# Patient Record
Sex: FEMALE | Race: WHITE | Employment: STUDENT | URBAN - METROPOLITAN AREA
[De-identification: names, ages, dates, MRNs, and addresses within clinical notes are randomized per-mention and may not be internally consistent; named-entity substitution may affect disease eponyms.]

---
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
## 2021-01-03 NOTE — PATIENT INSTRUCTIONS
Thank you for allowing me to participate in your care, it was a pleasure to care for you today. Should any questions or concerns arise, please call or message me on My Chart.     Blood work was ordered at your visit today. Some of the labs require you to be FASTING. This means nothing to eat or drink for 8-10 hours before. It is okay to have water and black coffee.    Mindfulness Apps & Websites  Self-Help Aps   1. Headspace  2. Buddhify  3. Pause  4. Level Up  5. Smiling Minds  6. Simply Being  7. Cohesive Self  8. Stop Breathe Think  9. MindShift    Self-help Websites  1. Calm.Chimeros - free guided meditations including a focus on sleep  2. Anxiety BC website -free handouts & tools for managing anxiety & panic  https://www.anxietybc.com/tools  https://www.anxietybc.com/adults/self-help-toolkit-anxiety-disorders  3. [employer] - free resource page for mindfulness recordings, poetry, handouts, articles:   http://www.weendypartPandora.TV.org/resources.shtml  4. Live Mindfully website - Free guided audio files to practice mindfulness-based stress reduction:  http://livemindfully.Nexio.Chimeros/  5. [facility] - Free guided audio files to practice mindfulness-based stress reduction:  https://health.[facility]/specialties/mindfulness/programs/mbsr/Pages/audio.aspx  6. [facility] Mindfulness Awareness Research Center - free guided meditations:  http://[facility]/mindful-meditations    Patient Education   Influenza (Flu) Vaccine (Inactivated or Recombinant): What You Need to Know  1. Why get vaccinated?  Influenza vaccine can prevent influenza (flu).  Flu is a contagious disease that spreads around the [place] every year, usually between October and May. Anyone can get the flu, but it is more dangerous for some people. Infants and young children, people 65 years of age and older, pregnant women, and people with certain health conditions or a weakened immune system are at greatest risk  of flu complications.  Pneumonia, bronchitis, sinus infections and ear infections are examples of flu-related complications. If you have a medical condition, such as heart disease, cancer or diabetes, flu can make it worse.  Flu can cause fever and chills, sore throat, muscle aches, fatigue, cough, headache, and runny or stuffy nose. Some people may have vomiting and diarrhea, though this is more common in children than adults.  Each year thousands of people in the [place] die from flu, and many more are hospitalized. Flu vaccine prevents millions of illnesses and flu-related visits to the doctor each year.  2. Influenza vaccine  CDC recommends everyone 6 months of age and older get vaccinated every flu season. Children 6 months through 8 years of age may need 2 doses during a single flu season. Everyone else needs only 1 dose each flu season.  It takes about 2 weeks for protection to develop after vaccination.  There are many flu viruses, and they are always changing. Each year a new flu vaccine is made to protect against three or four viruses that are likely to cause disease in the upcoming flu season. Even when the vaccine doesn't exactly match these viruses, it may still provide some protection.  Influenza vaccine does not cause flu.  Influenza vaccine may be given at the same time as other vaccines.  3. Talk with your health care provider  Tell your vaccine provider if the person getting the vaccine:  · Has had an allergic reaction after a previous dose of influenza vaccine, or has any severe, life-threatening allergies.  · Has ever had Guillain-Barré Syndrome (also called GBS).  In some cases, your health care provider may decide to postpone influenza vaccination to a future visit.  People with minor illnesses, such as a cold, may be vaccinated. People who are moderately or severely ill should usually wait until they recover before getting influenza vaccine.  Your health care provider can give you more  information.  4. Risks of a vaccine reaction  · Soreness, redness, and swelling where shot is given, fever, muscle aches, and headache can happen after influenza vaccine.  · There may be a very small increased risk of Guillain-Barré Syndrome (GBS) after inactivated influenza vaccine (the flu shot).  Young children who get the flu shot along with pneumococcal vaccine (PCV13), and/or DTaP vaccine at the same time might be slightly more likely to have a seizure caused by fever. Tell your health care provider if a child who is getting flu vaccine has ever had a seizure.  People sometimes faint after medical procedures, including vaccination. Tell your provider if you feel dizzy or have vision changes or ringing in the ears.  As with any medicine, there is a very remote chance of a vaccine causing a severe allergic reaction, other serious injury, or death.  5. What if there is a serious problem?  An allergic reaction could occur after the vaccinated person leaves the clinic. If you see signs of a severe allergic reaction (hives, swelling of the face and throat, difficulty breathing, a fast heartbeat, dizziness, or weakness), call 9-1-1 and get the person to the nearest hospital.  For other signs that concern you, call your health care provider.  Adverse reactions should be reported to the Vaccine Adverse Event Reporting System (VAERS). Your health care provider will usually file this report, or you can do it yourself. Visit the VAERS website at www.vaers.hhs.gov or call 1-550.450.4728.VAERS is only for reporting reactions, and VAERS staff do not give medical advice.  6. The National Vaccine Injury Compensation Program  The National Vaccine Injury Compensation Program (VICP) is a federal program that was created to compensate people who may have been injured by certain vaccines. Visit the VICP website at www.hrsa.gov/vaccinecompensation or call 1-606.162.1270 to learn about the program and about filing a claim. There is a  time limit to file a claim for compensation.  7. How can I learn more?  · Ask your healthcare provider.  · Call your local or state health department.  · Contact the Centers for Disease Control and Prevention (CDC):  ? Call 1-628.521.7101 (5-577-DPX-INFO) or  ? Visit CDC's www.cdc.gov/flu  Vaccine Information Statement (Interim) Inactivated Influenza Vaccine (8/15/2019)  This information is not intended to replace advice given to you by your health care provider. Make sure you discuss any questions you have with your health care provider.  Document Released: 10/12/2007 Document Revised: 04/07/2020 Document Reviewed: 08/19/2019  Elsevier Patient Education © 2020 [employer]vier Inc.

## 2021-01-03 NOTE — PROGRESS NOTES
"NEW PATIENT - PROBLEM VISIT    CAROLINA LOMAX D.O.  [facility]  [address] [place] [address]  5th [address]  [place] [address]  [phone]      HISTORY OF PRESENT ILLNESS        CC: [facility]  Dr. Lawler - [facility]     HPI:  Nasreen Hector is a 20 y.o. female who presents to establish care.     1. Hashimoto's Thyroiditis: Has not had blood work in a while. Not currently on any medication, she has never needed in the past, but feels like is really fatigued and having a lot of muscle aches.   2. Unexplained Weight Loss: She has had work-up including Lyme, celiac, vitamin D, and some type of autoimmune work-up in the past 2 years. She lost about 7-10 pounds 2 years ago, but her weight has been stable now for the past year.   3. Joint Pain: She has had pain in her hips, knees, and ankles. She doesn't feel limited, but feels like it is painful to do resistance exercises. She also feels like her feet are cold 24/7. She feels like her extremities are cold and \"numb like,\" but not numb. No fevers, but will occasionally have hot flashes for a few minutes from time to time. No rashes. She has notices some finger color change in the cold.   4. Anxiety: Never been formally diagnosed. Tries to get herself active or moving. She tries to take herself away from the moment. No interest in seeing a therapist. No mindfulness activities.     Health Maintenance:  HIV screening: due now  Hep C screening: due now  Flu vaccine: due today    PAST MEDICAL AND SURGICAL HISTORY        Past Medical History:   Diagnosis Date   • Thyroid disease        Past Surgical History:   Procedure Laterality Date   • WISDOM TOOTH EXTRACTION       MEDICATIONS        No current outpatient medications on file.  ALLERGIES        Patient has no known allergies.  FAMILY HISTORY        Family History   Problem Relation Age of Onset   • Hypertension Biological Mother      SOCIAL/ TOBACCO HISTORY        Social History     Tobacco " "Use   • Smoking status: Former Smoker     Types: Electronic Cigarette, Cigarettes   • Smokeless tobacco: Never Used   • Tobacco comment: quit 2 weeks ago    Substance Use Topics   • Alcohol use: Yes     Frequency: Monthly or less     Drinks per session: 1 or 2   • Drug use: Not Currently     REVIEW OF SYSTEMS        Review of Systems   Constitutional: Positive for unexpected weight change. Negative for chills, fatigue and fever.   HENT: Negative for dental problem, ear pain, hearing loss, rhinorrhea, sore throat and trouble swallowing.    Eyes: Negative for photophobia, pain and visual disturbance.   Respiratory: Negative for apnea, cough, chest tightness and shortness of breath.    Cardiovascular: Negative for chest pain, palpitations and leg swelling.   Gastrointestinal: Negative for abdominal pain, blood in stool, constipation, diarrhea, nausea and vomiting.   Endocrine: Negative for cold intolerance and heat intolerance.   Genitourinary: Negative for difficulty urinating, frequency and hematuria.   Musculoskeletal: Positive for arthralgias. Negative for back pain, joint swelling and myalgias.   Skin: Negative for rash and wound.   Allergic/Immunologic: Negative for environmental allergies and food allergies.   Neurological: Positive for numbness. Negative for dizziness, syncope, weakness and headaches.   Hematological: Negative for adenopathy. Does not bruise/bleed easily.   Psychiatric/Behavioral: Negative for self-injury and suicidal ideas. The patient is nervous/anxious.       PHYSICAL EXAMINATION     Visit Vitals  /70   Pulse 84   Temp 36.6 °C (97.8 °F)   Resp 16   Ht 1.651 m (5' 5\")   Wt 47.6 kg (105 lb)   SpO2 98%   BMI 17.47 kg/m²        Physical Exam  Constitutional:       General: She is not in acute distress.     Appearance: She is not diaphoretic.   HENT:      Head: Normocephalic and atraumatic.      Mouth/Throat:      Mouth: Mucous membranes are not dry.   Eyes:      Conjunctiva/sclera: " Conjunctivae normal.      Pupils: Pupils are equal, round, and reactive to light.   Neck:      Musculoskeletal: Neck supple. No neck rigidity.   Cardiovascular:      Rate and Rhythm: Normal rate and regular rhythm.      Heart sounds: No murmur. No friction rub. No gallop.    Pulmonary:      Effort: Pulmonary effort is normal.      Breath sounds: No wheezing, rhonchi or rales.   Musculoskeletal:         General: No swelling, tenderness or deformity.      Right hip: Normal.      Left hip: She exhibits normal range of motion, normal strength, no tenderness, no bony tenderness, no swelling, no crepitus, no deformity and no laceration.      Right knee: No tenderness found. No medial joint line, no lateral joint line, no MCL, no LCL and no patellar tendon tenderness noted.      Left knee: No tenderness found. No medial joint line, no lateral joint line, no MCL, no LCL and no patellar tendon tenderness noted.      Right lower leg: No edema.      Left lower leg: No edema.        Legs:    Lymphadenopathy:      Head:      Right side of head: No submandibular adenopathy.      Left side of head: No submandibular adenopathy.      Cervical: No cervical adenopathy.      Upper Body:      Right upper body: No supraclavicular adenopathy.      Left upper body: No supraclavicular adenopathy.   Neurological:      Mental Status: She is alert.      Cranial Nerves: No cranial nerve deficit.        ASSESSMENT AND PLAN   Assessment   Problem List Items Addressed This Visit        Nervous    Polyarthralgia - Primary     Multiple joint pains including hips, knees, and ankles also associated with cold extremities and Raynaud phenomenon.  She has personal history of autoimmune disease (Hashimoto's thyroiditis) as well as family history (mother with Sjogren's syndrome) making autoimmune disease much more likely.  However, would consider primary arthritis positive TIMOTHY test on left side and hip disorder could cause knee pain.  We will start with  autoimmune work-up including rheumatoid factor, anti-CCP, CRP, ESR, MERLIN, CBC, and CMP.  If any significant findings would refer to rheumatology if negative patient will follow up to determine next steps.  Would consider bilateral hip x-rays.         Relevant Orders    TSH w reflex FT4    T3, free    CBC and Differential    Comprehensive metabolic panel    MERLIN Screen (Automated)    CCP IgG/IgA Antibodies    Rheumatoid factor    Sjogrens syndrome-A&B extractable nuclear antibody    C-reactive protein    Sedimentation rate, automated    Vitamin D 25 hydroxy    Vitamin B12       Endocrine/Metabolic    Hashimoto's thyroiditis     Never required medication in the past, though feeling fatigued and having weight changes.  Check TSH, T4, and T3.            Other    General counselling and advice on contraception     Discussed contraceptive options including oral contraceptive pills, condoms, Nexplanon, IUD, Depo.  Given history of migraine with aura we discussed that oral contraceptive pills are contraindicated.  She is interested in a progesterone IUD and was referred to gynecology during today's visit.           Other Visit Diagnoses     Family history of Sjogren's disease        Relevant Orders    Sjogrens syndrome-A&B extractable nuclear antibody    Screening for HIV without presence of risk factors        Relevant Orders    HIV 1,2 AB P24 AG    Encounter for hepatitis C screening test for low risk patient        Relevant Orders    Hepatitis C antibody    Flu vaccine need        Relevant Orders    Influenza vaccine quadrivalent preservative free 6 mon and older IM (FluLaval) (Completed)           A total of 30 minutes was spent with the patient today.  Greater than 50% of the time was spent counseling the patient on the above and in coordination of her care.  Aislinn Kruse,   1/4/2021

## 2021-01-04 ENCOUNTER — OFFICE VISIT (OUTPATIENT)
Dept: PRIMARY CARE | Facility: CLINIC | Age: 21
End: 2021-01-04
Payer: COMMERCIAL

## 2021-01-04 VITALS
WEIGHT: 105 LBS | DIASTOLIC BLOOD PRESSURE: 70 MMHG | SYSTOLIC BLOOD PRESSURE: 108 MMHG | RESPIRATION RATE: 16 BRPM | TEMPERATURE: 97.8 F | HEIGHT: 65 IN | HEART RATE: 84 BPM | OXYGEN SATURATION: 98 % | BODY MASS INDEX: 17.49 KG/M2

## 2021-01-04 DIAGNOSIS — M25.50 POLYARTHRALGIA: Primary | ICD-10-CM

## 2021-01-04 DIAGNOSIS — Z30.09 GENERAL COUNSELLING AND ADVICE ON CONTRACEPTION: ICD-10-CM

## 2021-01-04 DIAGNOSIS — Z11.4 SCREENING FOR HIV WITHOUT PRESENCE OF RISK FACTORS: ICD-10-CM

## 2021-01-04 DIAGNOSIS — Z23 FLU VACCINE NEED: ICD-10-CM

## 2021-01-04 DIAGNOSIS — Z82.69 FAMILY HISTORY OF SJOGREN'S DISEASE: ICD-10-CM

## 2021-01-04 DIAGNOSIS — E06.3 HASHIMOTO'S THYROIDITIS: ICD-10-CM

## 2021-01-04 DIAGNOSIS — Z11.59 ENCOUNTER FOR HEPATITIS C SCREENING TEST FOR LOW RISK PATIENT: ICD-10-CM

## 2021-01-04 PROBLEM — G43.109 MIGRAINE WITH AURA: Status: ACTIVE | Noted: 2021-01-04

## 2021-01-04 PROCEDURE — 99203 OFFICE O/P NEW LOW 30 MIN: CPT | Mod: 25 | Performed by: FAMILY MEDICINE

## 2021-01-04 PROCEDURE — 90686 IIV4 VACC NO PRSV 0.5 ML IM: CPT | Performed by: FAMILY MEDICINE

## 2021-01-04 PROCEDURE — 90471 IMMUNIZATION ADMIN: CPT | Performed by: FAMILY MEDICINE

## 2021-01-04 SDOH — HEALTH STABILITY: MENTAL HEALTH: HOW MANY DRINKS CONTAINING ALCOHOL DO YOU HAVE ON A TYPICAL DAY WHEN YOU ARE DRINKING?: 1 OR 2

## 2021-01-04 SDOH — HEALTH STABILITY: MENTAL HEALTH: HOW OFTEN DO YOU HAVE A DRINK CONTAINING ALCOHOL?: MONTHLY OR LESS

## 2021-01-04 ASSESSMENT — ENCOUNTER SYMPTOMS
FEVER: 0
CHILLS: 0
SHORTNESS OF BREATH: 0
TROUBLE SWALLOWING: 0
JOINT SWELLING: 0
ABDOMINAL PAIN: 0
NERVOUS/ANXIOUS: 1
PALPITATIONS: 0
RHINORRHEA: 0
CHEST TIGHTNESS: 0
BLOOD IN STOOL: 0
FATIGUE: 0
NUMBNESS: 1
WOUND: 0
DIARRHEA: 0
VOMITING: 0
FREQUENCY: 0
MYALGIAS: 0
PHOTOPHOBIA: 0
COUGH: 0
DIZZINESS: 0
ADENOPATHY: 0
BRUISES/BLEEDS EASILY: 0
NAUSEA: 0
CONSTIPATION: 0
BACK PAIN: 0
EYE PAIN: 0
UNEXPECTED WEIGHT CHANGE: 1
APNEA: 0
ARTHRALGIAS: 1
HEADACHES: 0
WEAKNESS: 0
DIFFICULTY URINATING: 0
SORE THROAT: 0
HEMATURIA: 0

## 2021-01-04 ASSESSMENT — PATIENT HEALTH QUESTIONNAIRE - PHQ9: SUM OF ALL RESPONSES TO PHQ9 QUESTIONS 1 & 2: 0

## 2021-01-04 NOTE — ASSESSMENT & PLAN NOTE
Never required medication in the past, though feeling fatigued and having weight changes.  Check TSH, T4, and T3.

## 2021-01-04 NOTE — ASSESSMENT & PLAN NOTE
Discussed contraceptive options including oral contraceptive pills, condoms, Nexplanon, IUD, Depo.  Given history of migraine with aura we discussed that oral contraceptive pills are contraindicated.  She is interested in a progesterone IUD and was referred to gynecology during today's visit.   Deferred to Pediatrician's Office

## 2021-01-04 NOTE — ASSESSMENT & PLAN NOTE
Multiple joint pains including hips, knees, and ankles also associated with cold extremities and Raynaud phenomenon.  She has personal history of autoimmune disease (Hashimoto's thyroiditis) as well as family history (mother with Sjogren's syndrome) making autoimmune disease much more likely.  However, would consider primary arthritis positive TIMOTHY test on left side and hip disorder could cause knee pain.  We will start with autoimmune work-up including rheumatoid factor, anti-CCP, CRP, ESR, MERLIN, CBC, and CMP.  If any significant findings would refer to rheumatology if negative patient will follow up to determine next steps.  Would consider bilateral hip x-rays.

## 2021-01-05 LAB
25(OH)D3 SERPL-MCNC: 15 NG/ML (ref 30–100)
ALBUMIN SERPL-MCNC: 4.2 G/DL (ref 3.6–5.1)
ALBUMIN/GLOB SERPL: 1.8 (CALC) (ref 1–2.5)
ALP SERPL-CCNC: 51 U/L (ref 36–128)
ALT SERPL-CCNC: 14 U/L (ref 5–32)
ANA SER QL IF: NEGATIVE
AST SERPL-CCNC: 15 U/L (ref 12–32)
BASOPHILS # BLD AUTO: 28 CELLS/UL (ref 0–200)
BASOPHILS NFR BLD AUTO: 0.3 %
BILIRUB SERPL-MCNC: 0.5 MG/DL (ref 0.2–1.1)
BUN SERPL-MCNC: 8 MG/DL (ref 7–20)
BUN/CREAT SERPL: ABNORMAL (CALC) (ref 6–22)
CALCIUM SERPL-MCNC: 9.3 MG/DL (ref 8.9–10.4)
CCP IGG SERPL-ACNC: <16 UNITS
CHLORIDE SERPL-SCNC: 107 MMOL/L (ref 98–110)
CO2 SERPL-SCNC: 26 MMOL/L (ref 20–32)
CREAT SERPL-MCNC: 0.85 MG/DL (ref 0.5–1)
CRP SERPL-MCNC: 0.5 MG/L
ENA SS-A AB SER IA-ACNC: NORMAL AI
ENA SS-B AB SER IA-ACNC: NORMAL AI
EOSINOPHIL # BLD AUTO: 74 CELLS/UL (ref 15–500)
EOSINOPHIL NFR BLD AUTO: 0.8 %
ERYTHROCYTE [DISTWIDTH] IN BLOOD BY AUTOMATED COUNT: 12.4 % (ref 11–15)
ERYTHROCYTE [SEDIMENTATION RATE] IN BLOOD BY WESTERGREN METHOD: 2 MM/H
GLOBULIN SER CALC-MCNC: 2.3 G/DL (CALC) (ref 2–3.8)
GLUCOSE SERPL-MCNC: 78 MG/DL (ref 65–99)
HCT VFR BLD AUTO: 36.7 % (ref 35–45)
HCV AB S/CO SERPL IA: 0.02
HCV AB SERPL QL IA: NORMAL
HGB BLD-MCNC: 12.3 G/DL (ref 11.7–15.5)
HIV 1+2 AB+HIV1 P24 AG SERPL QL IA: NORMAL
LYMPHOCYTES # BLD AUTO: 1897 CELLS/UL (ref 850–3900)
LYMPHOCYTES NFR BLD AUTO: 20.4 %
MCH RBC QN AUTO: 32.4 PG (ref 27–33)
MCHC RBC AUTO-ENTMCNC: 33.5 G/DL (ref 32–36)
MCV RBC AUTO: 96.6 FL (ref 80–100)
MONOCYTES # BLD AUTO: 772 CELLS/UL (ref 200–950)
MONOCYTES NFR BLD AUTO: 8.3 %
NEUTROPHILS # BLD AUTO: 6529 CELLS/UL (ref 1500–7800)
NEUTROPHILS NFR BLD AUTO: 70.2 %
PLATELET # BLD AUTO: 216 THOUSAND/UL (ref 140–400)
PMV BLD REES-ECKER: 9.4 FL (ref 7.5–12.5)
POTASSIUM SERPL-SCNC: 3.7 MMOL/L (ref 3.8–5.1)
PROT SERPL-MCNC: 6.5 G/DL (ref 6.3–8.2)
QUEST EGFR NON-AFR. AMERICAN: 99 ML/MIN/1.73M2
RBC # BLD AUTO: 3.8 MILLION/UL (ref 3.8–5.1)
RHEUMATOID FACT SERPL-ACNC: <14 IU/ML
SODIUM SERPL-SCNC: 141 MMOL/L (ref 135–146)
T3FREE SERPL-MCNC: 2.6 PG/ML (ref 3–4.7)
TSH SERPL-ACNC: 4.23 MIU/L
VIT B12 SERPL-MCNC: 250 PG/ML (ref 200–1100)
WBC # BLD AUTO: 9.3 THOUSAND/UL (ref 3.8–10.8)

## 2021-01-14 ENCOUNTER — TELEPHONE (OUTPATIENT)
Dept: PRIMARY CARE | Facility: CLINIC | Age: 21
End: 2021-01-14

## 2021-01-14 ENCOUNTER — OFFICE VISIT (OUTPATIENT)
Dept: PRIMARY CARE | Facility: CLINIC | Age: 21
End: 2021-01-14
Payer: COMMERCIAL

## 2021-01-14 VITALS
WEIGHT: 106 LBS | DIASTOLIC BLOOD PRESSURE: 60 MMHG | OXYGEN SATURATION: 99 % | RESPIRATION RATE: 16 BRPM | BODY MASS INDEX: 17.64 KG/M2 | HEART RATE: 79 BPM | SYSTOLIC BLOOD PRESSURE: 110 MMHG | TEMPERATURE: 98 F

## 2021-01-14 DIAGNOSIS — E55.9 VITAMIN D INSUFFICIENCY: ICD-10-CM

## 2021-01-14 DIAGNOSIS — E53.8 LOW SERUM VITAMIN B12: ICD-10-CM

## 2021-01-14 DIAGNOSIS — E06.3 HASHIMOTO'S THYROIDITIS: ICD-10-CM

## 2021-01-14 DIAGNOSIS — M25.50 POLYARTHRALGIA: Primary | ICD-10-CM

## 2021-01-14 PROBLEM — Z30.09 GENERAL COUNSELLING AND ADVICE ON CONTRACEPTION: Status: RESOLVED | Noted: 2021-01-04 | Resolved: 2021-01-14

## 2021-01-14 PROCEDURE — 99213 OFFICE O/P EST LOW 20 MIN: CPT | Performed by: FAMILY MEDICINE

## 2021-01-14 RX ORDER — LEVOTHYROXINE SODIUM 25 UG/1
25 TABLET ORAL
Qty: 90 TABLET | Refills: 0 | Status: SHIPPED | OUTPATIENT
Start: 2021-01-14 | End: 2021-04-01

## 2021-01-14 ASSESSMENT — ENCOUNTER SYMPTOMS
COUGH: 0
CHILLS: 0
POLYPHAGIA: 0
FEVER: 0
ARTHRALGIAS: 1
PALPITATIONS: 0
WOUND: 0
SHORTNESS OF BREATH: 0
FATIGUE: 1
COLOR CHANGE: 0
JOINT SWELLING: 0
CHEST TIGHTNESS: 0
POLYDIPSIA: 0

## 2021-01-14 NOTE — ASSESSMENT & PLAN NOTE
Vitamin D 15 on most recent labs.   Start supplement of 2000 IU daily taken with a fat containing food. Counseled patient on vitamin D rich foods.   Repeat level in 6-8 weeks.

## 2021-01-14 NOTE — PATIENT INSTRUCTIONS
Thank you for allowing me to participate in your care, it was a pleasure to care for you today. Should any questions or concerns arise, please call or message me on My Chart.     WHAT YOU SHOULD KNOW ABOUT VITAMIN D DEFICIENCY AND SUPPLEMENTATION    Vitamin D is the only nutrient your body produces when exposed to sunlight. However, 40% of people in the [place] are deficient in vitamin D, partly because people spend more time indoors, wear sunblock outside, and eat a Western diet low in good sources of this vitamin. The recommended daily value (DV) is 800 IU (20 mcg) of vitamin D per day from foods.  If you don't get 30 minutes of sunlight a day, your intake should likely be closer to 1,000 IU (25 mcg) per day.    You can buy Vitamin D over the counter at any pharmacy. Vitamin D is best absorbed when taken with food or a meal that contains fat.     Sources of Dietary Vitamin D Include:  Wild salmon contains about 988 IU of vitamin D per serving, while farmed salmon contains 250 IU, on average. That’s 124% and 32% of the DV, respectively.  Mccarthy contains 216 IU of vitamin D per 3.5-ounce (100-gram) serving. Pickled herring, sardines, and other fatty fish, such as halibut and mackerel, are also good sources.  Cod liver oil contains 448 IU of vitamin D per teaspoon (4.9 ml), or 56% of the DV. It is also high in other nutrients, such as vitamin A and omega-3 fatty acids.  Canned tuna contains 268 IU of vitamin D per serving. Choose light tuna instead of albacore and eat 6 ounces (170 grams) or less per week to prevent methylmercury buildup.  Eggs from commercially raised hens contain only about 37 IU of vitamin D per yolk. However, eggs from hens raised outside or fed vitamin-D-enriched feed contain much higher levels.  Mushrooms can synthesize vitamin D2 when exposed to UV light. Only wild mushrooms or mushrooms treated with UV light are good sources of vitamin D.  Foods such as cow's milk, soy milk, orange  juice, cereals, and oatmeal are sometimes fortified with vitamin D. These contain  IU per serving.  ______________________________________________________________________________________________

## 2021-01-14 NOTE — ASSESSMENT & PLAN NOTE
TSH within range at 4.23 (though higher within normal range). Free T3 low at 0.62.   We discussed the risks and benefits of starting a low dose of thyroid repletion.   After discussion we decided to start 25 mcg of levothyroxine and repeat TSH, T4, and T3 in 6 weeks.

## 2021-01-14 NOTE — TELEPHONE ENCOUNTER
Patient declined to schedule follow up. Created reminder to reach out to her to schedule follow up visit.

## 2021-01-14 NOTE — ASSESSMENT & PLAN NOTE
Plan to replace thyroid and replete vitamin D and B12.   Follow-up in 6-8 weeks after labs are repeated.   If still symptomatic at that time would refer to rheumatology for further evaluation.   We also discussed the possibility of fibromyalgia and an option at follow-up would be to start low-dose amitriptyline at 10 mg nightly.

## 2021-01-14 NOTE — PROGRESS NOTES
ESTABLISHED PATIENT OFFICE VISIT    CAROLINA LOMAX D.O.  [facility]  [address] [place] [address]  [address]  [place] [address]  [phone]      HISTORY OF PRESENT ILLNESS        HPI:  Nasreen Hector is a 20 y.o. female with a history of polyarthralgia, Hashimoto's thyroiditis, and migraine with aura who presents for follow-up.     Polyarthralgia:  - CRP, ESR, Sjogren's RF, anti-CCP, and MERLIN negative  - HIV and Hep C  - No change in symptoms, still fatigued with polyarthralgias and felt like her knees might have been swollen at a higher altitude while driving to [place] the other day.    Hashimoto's Thyroiditis:  - TSH 4.23, T3 low at 2.6  - Not currently on thyroid replacement    Vitamin D Insufficiency:  - vitamin D level 15  - recommended that patient start 2000 IU daily with fat-containing food    B12:  - Most recent level was 250 (low-normal range)  - Recommended supplement of 1000 mcg daily     PAST MEDICAL AND SURGICAL HISTORY        Past Medical History:   Diagnosis Date   • Thyroid disease        Past Surgical History:   Procedure Laterality Date   • WISDOM TOOTH EXTRACTION       MEDICATIONS          Current Outpatient Medications:   •  levothyroxine (SYNTHROID) 25 mcg tablet, Take 1 tablet (25 mcg total) by mouth daily., Disp: 90 tablet, Rfl: 0  ALLERGIES        Patient has no known allergies.  FAMILY HISTORY        Family History   Problem Relation Age of Onset   • Hypertension Biological Mother      SOCIAL/ TOBACCO HISTORY        Social History     Tobacco Use   • Smoking status: Former Smoker     Types: Electronic Cigarette, Cigarettes   • Smokeless tobacco: Never Used   • Tobacco comment: quit 2 weeks ago    Substance Use Topics   • Alcohol use: Yes     Frequency: Monthly or less     Drinks per session: 1 or 2   • Drug use: Not Currently     REVIEW OF SYSTEMS        Review of Systems   Constitutional: Positive for fatigue. Negative for chills and fever.   Respiratory: Negative  for cough, chest tightness and shortness of breath.    Cardiovascular: Negative for chest pain, palpitations and leg swelling.   Endocrine: Negative for cold intolerance, heat intolerance, polydipsia, polyphagia and polyuria.   Musculoskeletal: Positive for arthralgias. Negative for gait problem and joint swelling.   Skin: Negative for color change, rash and wound.      PHYSICAL EXAMINATION     Visit Vitals  /60   Pulse 79   Temp 36.7 °C (98 °F)   Resp 16   Wt 48.1 kg (106 lb)   SpO2 99%   BMI 17.64 kg/m²        Physical Exam  Constitutional:       General: She is not in acute distress.     Appearance: She is not diaphoretic.   HENT:      Head: Normocephalic and atraumatic.      Nose: Nose normal.      Mouth/Throat:      Mouth: Mucous membranes are not dry.   Eyes:      Conjunctiva/sclera: Conjunctivae normal.      Pupils: Pupils are equal, round, and reactive to light.   Neck:      Musculoskeletal: Neck supple.      Thyroid: No thyroid mass, thyromegaly or thyroid tenderness.   Cardiovascular:      Rate and Rhythm: Normal rate and regular rhythm.      Heart sounds: No murmur. No friction rub. No gallop.    Pulmonary:      Effort: Pulmonary effort is normal.   Neurological:      Mental Status: She is alert.      Cranial Nerves: No cranial nerve deficit.        ASSESSMENT AND PLAN   Assessment   Problem List Items Addressed This Visit        Nervous    Polyarthralgia - Primary     Plan to replace thyroid and replete vitamin D and B12.   Follow-up in 6-8 weeks after labs are repeated.   If still symptomatic at that time would refer to rheumatology for further evaluation.   We also discussed the possibility of fibromyalgia and an option at follow-up would be to start low-dose amitriptyline at 10 mg nightly.            Digestive    Vitamin D insufficiency     Vitamin D 15 on most recent labs.   Start supplement of 2000 IU daily taken with a fat containing food. Counseled patient on vitamin D rich foods.   Repeat  level in 6-8 weeks.          Relevant Orders    Vitamin D 25 hydroxy       Endocrine/Metabolic    Hashimoto's thyroiditis     TSH within range at 4.23 (though higher within normal range). Free T3 low at 0.62.   We discussed the risks and benefits of starting a low dose of thyroid repletion.   After discussion we decided to start 25 mcg of levothyroxine and repeat TSH, T4, and T3 in 6 weeks.            Relevant Medications    levothyroxine (SYNTHROID) 25 mcg tablet    Other Relevant Orders    TSH    T4, free    T3, free       Hematologic    Low serum vitamin B12     Low/normal B12. Recommend taking 1000mcg of vitamin B12 daily.         Relevant Orders    Vitamin B12           I spent 21 minutes on this date of service performing the following activities: obtaining history, performing examination, entering orders, documenting, preparing for visit, providing counseling and education and communicating results.  Pre-charting on day of service: 6:57AM - 7:04 AM  Aislinn Kruse DO  1/14/2021

## 2021-02-03 ENCOUNTER — TELEPHONE (OUTPATIENT)
Dept: RHEUMATOLOGY | Facility: CLINIC | Age: 21
End: 2021-02-03

## 2021-02-10 ENCOUNTER — TELEPHONE (OUTPATIENT)
Dept: RHEUMATOLOGY | Facility: CLINIC | Age: 21
End: 2021-02-10

## 2021-03-30 LAB
25(OH)D3 SERPL-MCNC: 27 NG/ML (ref 30–100)
T3FREE SERPL-MCNC: 3.4 PG/ML (ref 3–4.7)
T4 FREE SERPL-MCNC: 1.1 NG/DL (ref 0.8–1.4)
TSH SERPL-ACNC: 2.84 MIU/L
VIT B12 SERPL-MCNC: 366 PG/ML (ref 200–1100)

## 2021-03-31 NOTE — PATIENT INSTRUCTIONS
Thank you for allowing me to participate in your care, it was a pleasure to care for you today. Should any questions or concerns arise, please call or message me on My Chart.     Please continue your vitamin D and B12 supplements.     Instructions on Scheduling a visit with [facility]:    [facility] is located on the 6th floor of the Main [facility].     To schedule an appointment call: [phone]  Select: Option #2 then Option #4      Please call and schedule an appointment with psychiatry, below is a list of local psychiatrists:    [facility]  [address] [place] [address], [place]  Phone: [phone]  Website: [email or web address]  (Additional locations in [facility], [place], [facility], and [place])    The [facility]  [place]  [phone]      [address],   Phone: [phone]  (Additional locations in [place] and [place])    [facility]  [address] [place] [address], [address]  [place] [address]  Phone: [phone]

## 2021-03-31 NOTE — PROGRESS NOTES
ESTABLISHED PATIENT OFFICE VISIT    CAROLINA LOMAX D.O.  [facility]  [address] [place] [address]  5th Floor  [place] [address]  [phone]      HISTORY OF PRESENT ILLNESS        HPI:  Nasreen Hector is a 20 y.o. female with a history of polyarthralgia, Hashimoto's thyroiditis, and migraine with aura who presents for follow-up.      Polyarthralgia:  - CRP, ESR, Sjogren's RF, anti-CCP, and MERLIN negative  - HIV and Hep C  - No change in symptoms, still fatigued with polyarthralgias and felt like her knees might have been swollen at a higher altitude while driving to [place] the other day.  - She has a visit with Dr. Gleason tomorrow.     Acne:  - Started breaking out after taking levothyroxine  - Improved, but still bothersome, mostly along her jaw line and forehead  - She has been using Cetaphil cleanser      Hashimoto's Thyroiditis:  - TSH 4.23, T3 low at 2.6  - Started thyroid replacement but patient had significant SEs  - Stopped medication and repeat thyroid studies 6 weeks later   - Repeat thyroid studies 3/29/21: TSH 2.84, T4 1.1, T3 3.4  - She is starting to feel better now, but I took a while to get back to normal.     Vitamin D Insufficiency:  - vitamin D level 15  - recommended that patient start 2000 IU daily with fat-containing food  - vitamin D now improved to 27     B12:  - Most recent level was 250 (low-normal range)  - Recommended supplement of 1000 mcg daily   - Now improved to 366    PAST MEDICAL AND SURGICAL HISTORY        Past Medical History:   Diagnosis Date   • Thyroid disease        Past Surgical History:   Procedure Laterality Date   • WISDOM TOOTH EXTRACTION       MEDICATIONS          Current Outpatient Medications:   •  adapalene (DIFFERIN) 0.1 % topical gel, Apply topically nightly., Disp: 45 g, Rfl: 0  ALLERGIES        Patient has no known allergies.  FAMILY HISTORY        Family History   Problem Relation Age of Onset   • Hypertension Biological Mother   "    SOCIAL/ TOBACCO HISTORY        Social History     Tobacco Use   • Smoking status: Former Smoker     Types: Electronic Cigarette, Cigarettes   • Smokeless tobacco: Never Used   • Tobacco comment: quit 2 weeks ago    Substance Use Topics   • Alcohol use: Yes     Frequency: Monthly or less     Drinks per session: 1 or 2   • Drug use: Not Currently     REVIEW OF SYSTEMS        Review of Systems   Constitutional: Negative for chills and fever.   Respiratory: Negative for choking, chest tightness and shortness of breath.    Cardiovascular: Negative for chest pain and palpitations.   Gastrointestinal: Negative for constipation, diarrhea, nausea and vomiting.   Musculoskeletal: Positive for arthralgias. Negative for gait problem, myalgias and neck pain.   Skin: Positive for rash.      PHYSICAL EXAMINATION     Visit Vitals  /70   Pulse 85   Temp 36.8 °C (98.3 °F)   Resp 16   Ht 1.651 m (5' 5\")   Wt 50.3 kg (111 lb)   SpO2 99%   BMI 18.47 kg/m²        Physical Exam  Constitutional:       General: She is not in acute distress.     Appearance: She is not diaphoretic.   HENT:      Head: Normocephalic and atraumatic.      Nose: Nose normal.      Mouth/Throat:      Mouth: Mucous membranes are not dry.   Eyes:      Conjunctiva/sclera: Conjunctivae normal.      Pupils: Pupils are equal, round, and reactive to light.   Neck:      Musculoskeletal: Neck supple.   Cardiovascular:      Rate and Rhythm: Normal rate and regular rhythm.      Heart sounds: No murmur. No friction rub. No gallop.    Pulmonary:      Effort: Pulmonary effort is normal.      Breath sounds: No wheezing, rhonchi or rales.   Skin:     Comments: Papular acne along jaw line and above eyebrows, no comedones or cysts.    Neurological:      Mental Status: She is alert.      Cranial Nerves: No cranial nerve deficit.        ASSESSMENT AND PLAN   Assessment   Problem List Items Addressed This Visit        Nervous    Polyarthralgia     Repletion of B12 and vitamin " D completed.   Still having polyarthralgia, recommended that patient see Rheumatology.   She is scheduled to see Dr. Gleason tomorrow.             Digestive    Vitamin D insufficiency     Vitamin D level now improved at 27.   Continue 2000 IU once daily.             Hematologic    Low serum vitamin B12 - Primary     B12 level improved, now 366. Could still be higher, she will continue 1000 mcg of vitamin B12 daily.             Other    Acne vulgaris     Continue mild cleanser and start Differin gel. RX sent but patient will purchase OTC if expensive. Counseled on risk of sensitivity and skin redness. Follow-up if not improving in 4-8 weeks. At which time would consider course of PO antibiotics or spironolactone.          Relevant Medications    adapalene (DIFFERIN) 0.1 % topical gel           I spent 25 minutes on this date of service performing the following activities: obtaining history, performing examination, entering orders, documenting and providing counseling and education.    Aislinn Kruse,   4/1/2021

## 2021-03-31 NOTE — ASSESSMENT & PLAN NOTE
B12 level improved, now 366. Could still be higher, she will continue 1000 mcg of vitamin B12 daily.

## 2021-03-31 NOTE — ASSESSMENT & PLAN NOTE
Repletion of B12 and vitamin D completed.   Still having polyarthralgia, recommended that patient see Rheumatology.   She is scheduled to see Dr. Gleason tomorrow.

## 2021-04-01 ENCOUNTER — OFFICE VISIT (OUTPATIENT)
Dept: PRIMARY CARE | Facility: CLINIC | Age: 21
End: 2021-04-01
Payer: COMMERCIAL

## 2021-04-01 VITALS
OXYGEN SATURATION: 99 % | WEIGHT: 111 LBS | DIASTOLIC BLOOD PRESSURE: 70 MMHG | TEMPERATURE: 98.3 F | HEIGHT: 65 IN | HEART RATE: 85 BPM | SYSTOLIC BLOOD PRESSURE: 118 MMHG | RESPIRATION RATE: 16 BRPM | BODY MASS INDEX: 18.49 KG/M2

## 2021-04-01 DIAGNOSIS — E53.8 LOW SERUM VITAMIN B12: Primary | ICD-10-CM

## 2021-04-01 DIAGNOSIS — M25.50 POLYARTHRALGIA: ICD-10-CM

## 2021-04-01 DIAGNOSIS — L70.0 ACNE VULGARIS: ICD-10-CM

## 2021-04-01 DIAGNOSIS — E55.9 VITAMIN D INSUFFICIENCY: ICD-10-CM

## 2021-04-01 PROCEDURE — 99213 OFFICE O/P EST LOW 20 MIN: CPT | Performed by: FAMILY MEDICINE

## 2021-04-01 RX ORDER — ADAPALENE 1 MG/G
GEL TOPICAL NIGHTLY
Qty: 45 G | Refills: 0 | Status: SHIPPED | OUTPATIENT
Start: 2021-04-01

## 2021-04-01 ASSESSMENT — ENCOUNTER SYMPTOMS
MYALGIAS: 0
PALPITATIONS: 0
CHILLS: 0
NAUSEA: 0
CONSTIPATION: 0
DIARRHEA: 0
FEVER: 0
NECK PAIN: 0
ARTHRALGIAS: 1
VOMITING: 0
SHORTNESS OF BREATH: 0
CHOKING: 0
CHEST TIGHTNESS: 0

## 2021-04-01 NOTE — ASSESSMENT & PLAN NOTE
Continue mild cleanser and start Differin gel. RX sent but patient will purchase OTC if expensive. Counseled on risk of sensitivity and skin redness. Follow-up if not improving in 4-8 weeks. At which time would consider course of PO antibiotics or spironolactone.

## 2021-04-02 ENCOUNTER — OFFICE VISIT (OUTPATIENT)
Dept: RHEUMATOLOGY | Facility: CLINIC | Age: 21
End: 2021-04-02
Payer: COMMERCIAL

## 2021-04-02 ENCOUNTER — TELEPHONE (OUTPATIENT)
Dept: PRIMARY CARE | Facility: CLINIC | Age: 21
End: 2021-04-02

## 2021-04-02 ENCOUNTER — HOSPITAL ENCOUNTER (OUTPATIENT)
Dept: RADIOLOGY | Facility: CLINIC | Age: 21
Discharge: HOME | End: 2021-04-02
Attending: INTERNAL MEDICINE
Payer: COMMERCIAL

## 2021-04-02 VITALS
SYSTOLIC BLOOD PRESSURE: 118 MMHG | WEIGHT: 110 LBS | RESPIRATION RATE: 16 BRPM | OXYGEN SATURATION: 98 % | HEIGHT: 65 IN | TEMPERATURE: 97.7 F | HEART RATE: 89 BPM | BODY MASS INDEX: 18.33 KG/M2 | DIASTOLIC BLOOD PRESSURE: 70 MMHG

## 2021-04-02 DIAGNOSIS — M25.562 CHRONIC PAIN OF BOTH KNEES: ICD-10-CM

## 2021-04-02 DIAGNOSIS — G89.29 CHRONIC PAIN OF BOTH KNEES: ICD-10-CM

## 2021-04-02 DIAGNOSIS — M25.559 HIP PAIN: ICD-10-CM

## 2021-04-02 DIAGNOSIS — M25.559 HIP PAIN: Primary | ICD-10-CM

## 2021-04-02 DIAGNOSIS — M25.561 CHRONIC PAIN OF BOTH KNEES: ICD-10-CM

## 2021-04-02 PROCEDURE — 73560 X-RAY EXAM OF KNEE 1 OR 2: CPT | Mod: 50

## 2021-04-02 PROCEDURE — 99205 OFFICE O/P NEW HI 60 MIN: CPT | Performed by: INTERNAL MEDICINE

## 2021-04-02 PROCEDURE — 3008F BODY MASS INDEX DOCD: CPT | Performed by: INTERNAL MEDICINE

## 2021-04-02 PROCEDURE — 73521 X-RAY EXAM HIPS BI 2 VIEWS: CPT

## 2021-04-02 NOTE — PROGRESS NOTES
Rheumatology                       Initial visit                Reason for visit:   Chief Complaint   Patient presents with   • Joint Pain       HPI     Nasreen Hector is a 20 y.o. female who presents to the office for bilateral hip and knee pain.  Patient has had a preliminary evaluation by her primary care physician with a negative MERLIN, rheumatoid factor, anti-CCP and inflammation markers ESR and CRP were within normal limits.  Patient was diagnosed with Hashimoto's thyroiditis and started on treatment with levothyroxine, but states that she developed side effects of the medication and it had to be discontinued.  She is not currently on any treatment for thyroid dysfunction.  Nasreen presents to the office complaining of chronic pain in both lower extremities, which she states involves muscles and joints.  She has been very active through middle school and high school, played mostly lacrosse, basketball and soccer, and sustained a few injuries.  She localizes the pain mostly in the groins, which she describes as ache sometimes sharp, pain can be a 5-7 in a pain scale 0-10, aggravated when she tries to do squats, lift weights, or run.  She localizes the pain in the groins, and states that sometimes the pain is a 10 out of 10 while exercising and she has to stop activity.  She is currently in college and is not practicing any sports.  She denied malar rash, oronasal ulcers, alopecia, sicca symptoms, blood cell dyscrasia, fever chills, chest pain, shortness of breath, dyspnea of exertion, other rashes.    Past Medical History:   Diagnosis Date   • Thyroid disease        Past Surgical History:   Procedure Laterality Date   • WISDOM TOOTH EXTRACTION         Social History     Tobacco Use   • Smoking status: Former Smoker     Types: Electronic Cigarette, Cigarettes   • Smokeless tobacco: Never Used   • Tobacco comment: quit 2 weeks ago    Substance Use Topics   • Alcohol use: Yes     Frequency:  Monthly or less     Drinks per session: 1 or 2   • Drug use: Not Currently       Family History   Problem Relation Age of Onset   • Hypertension Biological Mother    • Arthritis Biological Mother    • Arthritis Maternal Grandmother        Allergies:  Patient has no known allergies.    Current Outpatient Medications   Medication Sig Dispense Refill   • adapalene (DIFFERIN) 0.1 % topical gel Apply topically nightly. 45 g 0     No current facility-administered medications for this visit.        ROS  Review of Systems - Negative except as noted in HPI.      Objective     Vitals:    04/02/21 1157   BP: 118/70   Pulse: 89   Resp: 16   Temp: 36.5 °C (97.7 °F)   SpO2: 98%       Wt Readings from Last 3 Encounters:   04/02/21 49.9 kg (110 lb) (15 %, Z= -1.02)*   04/01/21 50.3 kg (111 lb) (17 %, Z= -0.95)*   01/14/21 48.1 kg (106 lb) (10 %, Z= -1.29)*     * Growth percentiles are based on CDC (Girls, 2-20 Years) data.       Body mass index is 18.59 kg/m².    Physical Exam  Constitutional:       Appearance: Normal appearance. She is normal weight.   HENT:      Head: Normocephalic.      Right Ear: External ear normal.      Left Ear: External ear normal.      Nose: Nose normal.      Mouth/Throat:      Pharynx: Oropharynx is clear.   Eyes:      Conjunctiva/sclera: Conjunctivae normal.   Neck:      Musculoskeletal: Normal range of motion and neck supple. No muscular tenderness.   Cardiovascular:      Rate and Rhythm: Normal rate and regular rhythm.   Pulmonary:      Effort: Pulmonary effort is normal.      Breath sounds: Normal breath sounds.   Abdominal:      General: Bowel sounds are normal.      Palpations: Abdomen is soft.   Musculoskeletal:      Left hip: She exhibits decreased range of motion.      Right knee: Normal.      Left knee: Normal.      Comments: Positive TIMOTHY test left hip.  Discomfort with external rotation of the right hip.   Skin:     Coloration: Skin is not pale.      Findings: No erythema.   Neurological:       Mental Status: She is oriented to person, place, and time. Mental status is at baseline.   Psychiatric:         Behavior: Behavior normal.         Thought Content: Thought content normal.           Auto-immune panel     Ref. Range 1/4/2021 09:15   Rheumatoid Factor Latest Ref Range: <14 IU/mL <14        Ref. Range 1/4/2021 09:15   Merlin Screen, Ifa Latest Ref Range: NEGATIVE  NEGATIVE        Ref. Range 1/4/2021 09:15   C-Reactive Protein Latest Ref Range: <8.0 mg/L 0.5   Cyclic Citrullinated Peptide (Ccp) Ab (Igg) Latest Units: UNITS <16          Ref. Range 1/4/2021 09:15   Sjogren'S Antibody (Ss-A) Latest Ref Range: <1.0 NEG AI <1.0 NEG   Sjogren'S Antibody (Ss-B) Latest Ref Range: <1.0 NEG AI <1.0 NEG        Ref. Range 1/4/2021 09:15   HIV Ag/Ab 4th Gen Latest Ref Range: NON-REACTIVE  NON-REACTIVE     Hematology  Lab Results   Component Value Date    WBC 9.3 01/04/2021    HGB 12.3 01/04/2021    HCT 36.7 01/04/2021     01/04/2021       Chemistries  Lab Results   Component Value Date     01/04/2021    K 3.7 (L) 01/04/2021     01/04/2021    CREATININE 0.85 01/04/2021    BUN 8 01/04/2021    CO2 26 01/04/2021    GLUCOSE 78 01/04/2021    CALCIUM 9.3 01/04/2021    ALT 14 01/04/2021    AST 15 01/04/2021         Endocrine  Lab Results   Component Value Date    TSH 2.84 03/29/2021         Assessment/Plan     Patient is a 20-year-old female with a history of chronic joint pain for the past 2 years, involving bilateral lower extremities, which she localizes mostly in hips and knees:    1) Bilateral hip pain: Left hip examination with positive TIMOTHY test.  Recommended to check x-rays and MRI of the hip to rule out internal derangement.  -On history and examination patient had no features of an inflammatory arthropathy causing the symptoms, she also had blood work that showed negative MERLIN, anti-CCP, rheumatoid factor and inflammation markers ESR and CRP were within normal limits.    2) Bilateral knee pain:  Examination was unremarkable.  Check x-rays of the knees    3) History of hypothyroidism: Last TSH on 3/29/2021 was within normal limits.  I explained to the patient that untreated hypothyroidism may cause myositis.    At this time, no features of inflammatory arthritis or rheumatoid arthritis.  Rule out internal derangement of the joints.      Mignon Gleason MD  4/2/2021

## 2021-04-02 NOTE — TELEPHONE ENCOUNTER
Patient would like to know should she wait until her X-ray results are back to schedule her MRI or should she just go ahead an schedule it.

## 2021-04-05 NOTE — TELEPHONE ENCOUNTER
Phone call to patient. I advised her to call Dr Peña's office who ordered testing for there recommendation

## 2021-04-09 ENCOUNTER — HOSPITAL ENCOUNTER (OUTPATIENT)
Dept: RADIOLOGY | Age: 21
Discharge: HOME | End: 2021-04-09
Attending: INTERNAL MEDICINE
Payer: COMMERCIAL

## 2021-04-09 DIAGNOSIS — M25.562 CHRONIC PAIN OF BOTH KNEES: ICD-10-CM

## 2021-04-09 DIAGNOSIS — M25.559 HIP PAIN: ICD-10-CM

## 2021-04-09 DIAGNOSIS — M25.561 CHRONIC PAIN OF BOTH KNEES: ICD-10-CM

## 2021-04-09 DIAGNOSIS — G89.29 CHRONIC PAIN OF BOTH KNEES: ICD-10-CM

## 2021-05-11 ENCOUNTER — OFFICE VISIT (OUTPATIENT)
Dept: PRIMARY CARE | Facility: CLINIC | Age: 21
End: 2021-05-11
Payer: COMMERCIAL

## 2021-05-11 VITALS
WEIGHT: 106 LBS | HEART RATE: 62 BPM | BODY MASS INDEX: 17.91 KG/M2 | OXYGEN SATURATION: 99 % | SYSTOLIC BLOOD PRESSURE: 118 MMHG | TEMPERATURE: 98.2 F | DIASTOLIC BLOOD PRESSURE: 70 MMHG | RESPIRATION RATE: 16 BRPM

## 2021-05-11 DIAGNOSIS — E06.3 HASHIMOTO'S THYROIDITIS: ICD-10-CM

## 2021-05-11 DIAGNOSIS — F41.9 ANXIETY: Primary | ICD-10-CM

## 2021-05-11 DIAGNOSIS — M25.50 POLYARTHRALGIA: ICD-10-CM

## 2021-05-11 PROCEDURE — 99214 OFFICE O/P EST MOD 30 MIN: CPT | Performed by: FAMILY MEDICINE

## 2021-05-11 PROCEDURE — 3008F BODY MASS INDEX DOCD: CPT | Performed by: FAMILY MEDICINE

## 2021-05-11 RX ORDER — SERTRALINE HYDROCHLORIDE 25 MG/1
25 TABLET, FILM COATED ORAL DAILY
Qty: 90 TABLET | Refills: 0 | Status: SHIPPED | OUTPATIENT
Start: 2021-05-11 | End: 2021-06-14 | Stop reason: SDUPTHER

## 2021-05-11 RX ORDER — NORETHINDRONE ACETATE AND ETHINYL ESTRADIOL 1MG-20(24)
KIT ORAL
COMMUNITY
Start: 2021-05-07

## 2021-05-11 ASSESSMENT — ENCOUNTER SYMPTOMS
CONSTIPATION: 0
CHEST TIGHTNESS: 0
CHILLS: 0
CHOKING: 0
VOMITING: 0
DIARRHEA: 0
ARTHRALGIAS: 1
MYALGIAS: 1
SHORTNESS OF BREATH: 0
FEVER: 0
BACK PAIN: 0
JOINT SWELLING: 0
NERVOUS/ANXIOUS: 1
PALPITATIONS: 0
NAUSEA: 0

## 2021-05-11 NOTE — PROGRESS NOTES
"ESTABLISHED PATIENT OFFICE VISIT    CAROLINA LOMAX D.O.  [facility]  [address] [place] [address]  [address]  [place] [address]  [phone]      HISTORY OF PRESENT ILLNESS        HPI:  Nasreen Hector is a 20 y.o. female with a history of anxiety and hashimoto's thyroiditis who presents for   Chief Complaint   Patient presents with   • Anxiety     Anxiety:  - Feeling like Anxiety is poorly controlled today.   - GAD7: 11  - No SI or HI  - She is interested in starting medication, she would also like a note for her emotional support animal.     Joint Pains:  - Not improved at all, saw Rheumatology who ran labs and found \"MERLIN positive as well as thyroid antibodies.\"   - She has not had a rheumatology follow-up. I cannot access these labs.   - She does have a history of Hashimoto's thyroiditis and Rheumatologist suggested this may be a cause.     PAST MEDICAL AND SURGICAL HISTORY        Past Medical History:   Diagnosis Date   • Thyroid disease        Past Surgical History:   Procedure Laterality Date   • WISDOM TOOTH EXTRACTION       MEDICATIONS          Current Outpatient Medications:   •  adapalene (DIFFERIN) 0.1 % topical gel, Apply topically nightly., Disp: 45 g, Rfl: 0  •  BLISOVI 24 FE 1 mg-20 mcg (24)/75 mg (4) per tablet, , Disp: , Rfl:   •  sertraline (ZOLOFT) 25 mg tablet, Take 1 tablet (25 mg total) by mouth daily., Disp: 90 tablet, Rfl: 0  ALLERGIES        Patient has no known allergies.  FAMILY HISTORY        Family History   Problem Relation Age of Onset   • Hypertension Biological Mother    • Arthritis Biological Mother    • Arthritis Maternal Grandmother      SOCIAL/ TOBACCO HISTORY        Social History     Tobacco Use   • Smoking status: Former Smoker     Types: Electronic Cigarette, Cigarettes   • Smokeless tobacco: Never Used   • Tobacco comment: quit 2 weeks ago    Substance Use Topics   • Alcohol use: Yes   • Drug use: Not Currently     REVIEW OF SYSTEMS        Review of " Systems   Constitutional: Negative for chills and fever.   Respiratory: Negative for choking, chest tightness and shortness of breath.    Cardiovascular: Negative for chest pain and palpitations.   Gastrointestinal: Negative for constipation, diarrhea, nausea and vomiting.   Musculoskeletal: Positive for arthralgias and myalgias. Negative for back pain, gait problem and joint swelling.   Skin: Negative for rash.   Psychiatric/Behavioral: Negative for self-injury and suicidal ideas. The patient is nervous/anxious.       PHYSICAL EXAMINATION     Visit Vitals  /70   Pulse 62   Temp 36.8 °C (98.2 °F)   Resp 16   Wt 48.1 kg (106 lb)   SpO2 99%   BMI 17.91 kg/m²        Physical Exam  Constitutional:       General: She is not in acute distress.     Appearance: She is not diaphoretic.   HENT:      Head: Normocephalic and atraumatic.      Nose: Nose normal.      Mouth/Throat:      Mouth: Mucous membranes are not dry.   Eyes:      Conjunctiva/sclera: Conjunctivae normal.      Pupils: Pupils are equal, round, and reactive to light.   Cardiovascular:      Rate and Rhythm: Normal rate and regular rhythm.      Heart sounds: No murmur heard.   No friction rub. No gallop.    Pulmonary:      Effort: Pulmonary effort is normal.      Breath sounds: No wheezing, rhonchi or rales.   Musculoskeletal:      Cervical back: Neck supple.   Neurological:      Mental Status: She is alert.      Cranial Nerves: No cranial nerve deficit.        ASSESSMENT AND PLAN   Assessment   Problem List Items Addressed This Visit        Nervous    Polyarthralgia     We discussed differential diagnosis including possible fibromyalgia. Work-up with rheumatology revealed only elevated TPO and questionable elevated MERLIN, though I have not been able to have the labs load to review.   We discussed that if evaluation with endocrinology does not reveal any cause for her joint and muscle aches I would recommend treatment for fibromyalgia.              Endocrine/Metabolic    Hashimoto's thyroiditis     Elevated TPO on labs with rheumatology.   Referred to endocrinology for further evaluation given normal TSH, T3, and T4.          Relevant Orders    Ambulatory referral to Endocrinology       Other    Anxiety - Primary     Patient with worsening anxiety.   Start Zoloft 25mg daily. Reviewed side effects and counseled to anticipate the medication taking 3-4 weeks to begin to work. We will follow-up in 4 weeks.   Emotional support animal letter written.                 I spent 36 minutes on this date of service performing the following activities: obtaining history, performing examination, entering orders, documenting, preparing for visit and providing counseling and education.    Aislinn Kruse,   5/11/2021

## 2021-05-11 NOTE — ASSESSMENT & PLAN NOTE
Elevated TPO on labs with rheumatology.   Referred to endocrinology for further evaluation given normal TSH, T3, and T4.

## 2021-05-11 NOTE — ASSESSMENT & PLAN NOTE
We discussed differential diagnosis including possible fibromyalgia. Work-up with rheumatology revealed only elevated TPO and questionable elevated MERLIN, though I have not been able to have the labs load to review.   We discussed that if evaluation with endocrinology does not reveal any cause for her joint and muscle aches I would recommend treatment for fibromyalgia.

## 2021-05-11 NOTE — ASSESSMENT & PLAN NOTE
Patient with worsening anxiety.   Start Zoloft 25mg daily. Reviewed side effects and counseled to anticipate the medication taking 3-4 weeks to begin to work. We will follow-up in 4 weeks.   Emotional support animal letter written.

## 2021-05-11 NOTE — LETTER
May 11, 2021      To Whom it May Concern,       Nasreen Hector (YOB: 2000) is a patient under may care and is currently being treated for Anxiety (ICD-10 F41.9).     It is my professional opinion that an emotional support animal is necessary to help alleviate some of her symptoms and for her to better manage her condition. I have recommended and prescribe that Nasreen be accompanied by her emotional support dog, Johny (approx. 15 pound dachshund).     As her emotional support animal is a necessary part of her treatment, she will need to be allowed to have him in her housing.     Should you have any question, please reach out.       Sincerely,           Dr. Aislinn Kruse, DO

## 2021-06-14 ENCOUNTER — OFFICE VISIT (OUTPATIENT)
Dept: PRIMARY CARE | Facility: CLINIC | Age: 21
End: 2021-06-14
Payer: COMMERCIAL

## 2021-06-14 VITALS
TEMPERATURE: 98.2 F | BODY MASS INDEX: 17.76 KG/M2 | RESPIRATION RATE: 16 BRPM | HEART RATE: 93 BPM | SYSTOLIC BLOOD PRESSURE: 108 MMHG | DIASTOLIC BLOOD PRESSURE: 60 MMHG | OXYGEN SATURATION: 98 % | WEIGHT: 106.6 LBS | HEIGHT: 65 IN

## 2021-06-14 DIAGNOSIS — F41.9 ANXIETY: Primary | ICD-10-CM

## 2021-06-14 DIAGNOSIS — E06.3 HASHIMOTO'S THYROIDITIS: ICD-10-CM

## 2021-06-14 PROCEDURE — 99213 OFFICE O/P EST LOW 20 MIN: CPT | Performed by: FAMILY MEDICINE

## 2021-06-14 PROCEDURE — 3008F BODY MASS INDEX DOCD: CPT | Performed by: FAMILY MEDICINE

## 2021-06-14 RX ORDER — SERTRALINE HYDROCHLORIDE 25 MG/1
25 TABLET, FILM COATED ORAL DAILY
Qty: 90 TABLET | Refills: 1 | Status: SHIPPED | OUTPATIENT
Start: 2021-06-14 | End: 2021-07-26 | Stop reason: SDUPTHER

## 2021-06-14 ASSESSMENT — ENCOUNTER SYMPTOMS
CONSTIPATION: 0
NAUSEA: 0
NERVOUS/ANXIOUS: 0
SLEEP DISTURBANCE: 0
PALPITATIONS: 0
VOMITING: 0
FEVER: 0
CHOKING: 0
SHORTNESS OF BREATH: 0
DIARRHEA: 0
CHILLS: 0
CHEST TIGHTNESS: 0

## 2021-06-14 NOTE — ASSESSMENT & PLAN NOTE
Patient has appointment with endocrinology to discuss elevated TPO and antithyroglobulin antibody given otherwise negative polyarthralgia work-up with rheumatology.  Her most recent TSH, T3, and T4 were normal.

## 2021-06-14 NOTE — PROGRESS NOTES
"ESTABLISHED PATIENT OFFICE VISIT    CAROLINA LOMAX D.O.  [facility]  [address] [place] [address]  [address] Floor  [place] [address] [phone]      HISTORY OF PRESENT ILLNESS        HPI:  Nasreen Hector is a 20 y.o. female with a history of pulmonary high-grade, vitamin D insufficiency, anxiety who presents for follow-up.     Anxiety:  - Feeling like Anxiety is poorly controlled today.   - GAD7: 11  - No SI or HI  - Started on Zoloft 25mg at last visit.   - She feels like 2 weeks ago she was driving to see her boyfriend and her mind felt calmer. She feels like she is a lot happier.      Joint Pains:  - Not improved at all, saw Rheumatology who ran labs and found \"MERLIN positive as well as thyroid antibodies.\"   - She has not had a rheumatology follow-up.  - She does have a history of Hashimoto's thyroiditis and Rheumatologist suggested this may be a cause. She was referred to endocrinology at last visit.     Elevated TPO:   Elevated TPO on labs with rheumatology.   Referred to endocrinology for further evaluation given normal TSH, T3, and T4.     PAST MEDICAL AND SURGICAL HISTORY        Past Medical History:   Diagnosis Date   • Thyroid disease        Past Surgical History:   Procedure Laterality Date   • WISDOM TOOTH EXTRACTION       MEDICATIONS          Current Outpatient Medications:   •  adapalene (DIFFERIN) 0.1 % topical gel, Apply topically nightly., Disp: 45 g, Rfl: 0  •  BLISOVI 24 FE 1 mg-20 mcg (24)/75 mg (4) per tablet, , Disp: , Rfl:   •  sertraline (ZOLOFT) 25 mg tablet, Take 1 tablet (25 mg total) by mouth daily., Disp: 90 tablet, Rfl: 1  ALLERGIES        Patient has no known allergies.  FAMILY HISTORY        Family History   Problem Relation Age of Onset   • Hypertension Biological Mother    • Arthritis Biological Mother    • Arthritis Maternal Grandmother      SOCIAL/ TOBACCO HISTORY        Social History     Tobacco Use   • Smoking status: Former Smoker     Types: Electronic " "Cigarette, Cigarettes   • Smokeless tobacco: Never Used   • Tobacco comment: quit 2 weeks ago    Substance Use Topics   • Alcohol use: Yes   • Drug use: Not Currently     REVIEW OF SYSTEMS        Review of Systems   Constitutional: Negative for chills and fever.   Respiratory: Negative for choking, chest tightness and shortness of breath.    Cardiovascular: Negative for chest pain and palpitations.   Gastrointestinal: Negative for constipation, diarrhea, nausea and vomiting.   Psychiatric/Behavioral: Negative for self-injury, sleep disturbance and suicidal ideas. The patient is not nervous/anxious.       PHYSICAL EXAMINATION     Visit Vitals  /60   Pulse 93   Temp 36.8 °C (98.2 °F)   Resp 16   Ht 1.638 m (5' 4.5\")   Wt 48.4 kg (106 lb 9.6 oz)   SpO2 98%   BMI 18.02 kg/m²        Physical Exam  Constitutional:       General: She is not in acute distress.     Appearance: She is not diaphoretic.   HENT:      Head: Normocephalic and atraumatic.      Nose: Nose normal.      Mouth/Throat:      Mouth: Mucous membranes are not dry.   Eyes:      Conjunctiva/sclera: Conjunctivae normal.      Pupils: Pupils are equal, round, and reactive to light.   Cardiovascular:      Rate and Rhythm: Normal rate and regular rhythm.      Heart sounds: No murmur heard.   No friction rub. No gallop.    Pulmonary:      Effort: Pulmonary effort is normal.      Breath sounds: No wheezing, rhonchi or rales.   Musculoskeletal:      Cervical back: Neck supple.   Neurological:      Mental Status: She is alert.      Cranial Nerves: No cranial nerve deficit.   Psychiatric:         Mood and Affect: Mood normal.         Behavior: Behavior normal.        ASSESSMENT AND PLAN   Assessment   Problem List Items Addressed This Visit        Endocrine/Metabolic    Hashimoto's thyroiditis     Patient has appointment with endocrinology to discuss elevated TPO and antithyroglobulin antibody given otherwise negative polyarthralgia work-up with rheumatology.  " Her most recent TSH, T3, and T4 were normal.            Other    Anxiety - Primary     Feeling much improved on Zoloft 25 mg and feels that this is a good dose for her.  She is not interested in increase at this time will send me a message should she change her mind in the future.  No increased thoughts of suicide or self-harm.                I spent 20 minutes on this date of service performing the following activities: obtaining history, performing examination, entering orders, documenting, preparing for visit and providing counseling and education.'  Aislinn Kruse,   6/14/2021

## 2021-06-14 NOTE — ASSESSMENT & PLAN NOTE
Feeling much improved on Zoloft 25 mg and feels that this is a good dose for her.  She is not interested in increase at this time will send me a message should she change her mind in the future.  No increased thoughts of suicide or self-harm.

## 2021-06-14 NOTE — PATIENT INSTRUCTIONS
Thank you for allowing me to participate in your care, it was a pleasure to care for you today. Should any questions or concerns arise, please call or message me on My Chart.

## 2021-08-02 ENCOUNTER — TELEMEDICINE (OUTPATIENT)
Dept: PRIMARY CARE | Facility: CLINIC | Age: 21
End: 2021-08-02
Payer: COMMERCIAL

## 2021-08-02 DIAGNOSIS — F41.9 ANXIETY: ICD-10-CM

## 2021-08-02 DIAGNOSIS — M25.50 POLYARTHRALGIA: Primary | ICD-10-CM

## 2021-08-02 PROCEDURE — 99213 OFFICE O/P EST LOW 20 MIN: CPT | Mod: 95 | Performed by: FAMILY MEDICINE

## 2021-08-02 ASSESSMENT — ENCOUNTER SYMPTOMS
MYALGIAS: 0
VOMITING: 0
CHILLS: 0
NAUSEA: 0
FEVER: 0
SHORTNESS OF BREATH: 0
JOINT SWELLING: 0
CONSTIPATION: 0
PALPITATIONS: 0
CHEST TIGHTNESS: 0
DIARRHEA: 0
CHOKING: 0
BACK PAIN: 0
ARTHRALGIAS: 1

## 2021-08-02 NOTE — PROGRESS NOTES
"Verification of Patient Location:  The patient affirms they are currently located in the following state: [place]    Request for Consent:    Audio and Video Encounter   Hello, my name is Aislinn Lomax DO.  Before we proceed, can you please verify your identification by telling me your full name and date of birth?  Can you tell me who is in the room with you?    You and I are about to have a telemedicine check-in or visit because you have requested it.  This is a live video-conference.  I am a real person, speaking to you in real time.  There is no one else with me on the video-conference.  However, when we use ([employer], [employer], etc) it is important for you to know that the video-conference may not be secure or private.  I am not recording this conversation and I am asking you not to record it.  This telemedicine visit will be billed to your health insurance or you, if you are self-insured.  You understand you will be responsible for any copayments or coinsurances that apply to your telemedicine visit.  Communication platform used for this encounter:  [employer] Video Visit (with Zoom integration)     Before starting our telemedicine visit, I am required to get your consent for this virtual check-in or visit by telemedicine. Do you consent?      Patient Response to Request for Consent:  Yes      ESTABLISHED PATIENT OFFICE VISIT    AISLINN LOMAX D.O.  [facility]  [address] [place] [address]  [address] [place] [address]  [phone]      HISTORY OF PRESENT ILLNESS        HPI:  Nasreen Hector is a 20 y.o. female with a history of pulmonary high-grade, vitamin D insufficiency, anxiety who presents for follow-up.      Anxiety:  - Feeling like Anxiety is poorly controlled today.   - GAD7: 11  - No SI or HI  - Meds: Zoloft 50mg  - Since last visit she has been      Joint Pains:  - Not improved at all, saw Rheumatology who ran labs and found \"MERLIN positive as well as thyroid antibodies.\"   - " She has not had a rheumatology follow-up.  - She does have a history of Hashimoto's thyroiditis and Rheumatologist suggested this may be a cause. She was referred to endocrinology at last visit.   - The pain is 24/7 and feels like her joints are aching all the time. She just feels pain all the time. The rest of her legs feel fatigued constantly.     PAST MEDICAL AND SURGICAL HISTORY        Past Medical History:   Diagnosis Date   • Thyroid disease        Past Surgical History:   Procedure Laterality Date   • WISDOM TOOTH EXTRACTION       MEDICATIONS          Current Outpatient Medications:   •  adapalene (DIFFERIN) 0.1 % topical gel, Apply topically nightly., Disp: 45 g, Rfl: 0  •  BLISOVI 24 FE 1 mg-20 mcg (24)/75 mg (4) per tablet, , Disp: , Rfl:   •  sertraline (ZOLOFT) 50 mg tablet, Take 1 tablet (50 mg total) by mouth daily., Disp: 90 tablet, Rfl: 1  ALLERGIES        Patient has no known allergies.  FAMILY HISTORY        Family History   Problem Relation Age of Onset   • Hypertension Biological Mother    • Arthritis Biological Mother    • Arthritis Maternal Grandmother      SOCIAL/ TOBACCO HISTORY        Social History     Tobacco Use   • Smoking status: Former Smoker     Types: Electronic Cigarette, Cigarettes   • Smokeless tobacco: Never Used   • Tobacco comment: quit 2 weeks ago    Substance Use Topics   • Alcohol use: Yes   • Drug use: Not Currently     REVIEW OF SYSTEMS        Review of Systems   Constitutional: Negative for chills and fever.   Respiratory: Negative for choking, chest tightness and shortness of breath.    Cardiovascular: Negative for chest pain and palpitations.   Gastrointestinal: Negative for constipation, diarrhea, nausea and vomiting.   Musculoskeletal: Positive for arthralgias. Negative for back pain, gait problem, joint swelling and myalgias.      PHYSICAL EXAMINATION   There were no vitals taken for this visit.     Physical Exam  Constitutional:       General: She is not in acute  distress.     Appearance: She is not toxic-appearing.   Pulmonary:      Comments: Speaking in complete sentences. No breathlessness.   Neurological:      Mental Status: She is alert.     Exam limited secondary to video visit.    ASSESSMENT AND PLAN   Assessment   Problem List Items Addressed This Visit        Nervous    Polyarthralgia - Primary     Polyarthralgias with borderline positive MERLIN and anti-TPO antibodies.  She is planning to get a second opinion from a rheumatologist in [place] while she is at school.  Would also consider fibromyalgia which we discussed during today's visit.  Would consider amitriptyline or Flexeril at bedtime for treatment of symptoms.  Physical activity helps improve symptoms and she will continue to be as physically active as possible.            Other    Anxiety     No significant improvement with increase Zoloft dose from 25 to 50 mg however she has only been on this dose for about 10 days.  We discussed that it may take about 3 to 4 weeks for her to notice the full benefit of this dose and she will touch base with me and let me know how she is feeling at that time.  We also discussed making an appointment with a counselor at school.                I spent 27 minutes on this date of service performing the following activities: obtaining history, performing examination, entering orders, documenting, preparing for visit and providing counseling and education.    Aislinn Kruse, DO  8/2/2021

## 2021-08-02 NOTE — ASSESSMENT & PLAN NOTE
No significant improvement with increase Zoloft dose from 25 to 50 mg however she has only been on this dose for about 10 days.  We discussed that it may take about 3 to 4 weeks for her to notice the full benefit of this dose and she will touch base with me and let me know how she is feeling at that time.  We also discussed making an appointment with a counselor at school.

## 2021-08-02 NOTE — PROGRESS NOTES
Verification of Patient Location:  The patient affirms they are currently located in the following state: [place]    Request for Consent:    Audio and Video Encounter   Hello, my name is Aislinn Kruse DO.  Before we proceed, can you please verify your identification by telling me your full name and date of birth?  Can you tell me who is in the room with you?    You and I are about to have a telemedicine check-in or visit because you have requested it.  This is a live video-conference.  I am a real person, speaking to you in real time.  There is no one else with me on the video-conference.  However, when we use ([employer], [employer], etc) it is important for you to know that the video-conference may not be secure or private.  I am not recording this conversation and I am asking you not to record it.  This telemedicine visit will be billed to your health insurance or you, if you are self-insured.  You understand you will be responsible for any copayments or coinsurances that apply to your telemedicine visit.  Communication platform used for this encounter:  [email or web address] Video Visit (no Zoom)     Before starting our telemedicine visit, I am required to get your consent for this virtual check-in or visit by telemedicine. Do you consent?      Patient Response to Request for Consent:  Yes

## 2021-08-02 NOTE — ASSESSMENT & PLAN NOTE
Polyarthralgias with borderline positive MERLIN and anti-TPO antibodies.  She is planning to get a second opinion from a rheumatologist in [place] while she is at school.  Would also consider fibromyalgia which we discussed during today's visit.  Would consider amitriptyline or Flexeril at bedtime for treatment of symptoms.  Physical activity helps improve symptoms and she will continue to be as physically active as possible.

## 2022-01-14 RX ORDER — SERTRALINE HYDROCHLORIDE 25 MG/1
25 TABLET, FILM COATED ORAL DAILY
Qty: 90 TABLET | Refills: 0 | Status: SHIPPED | OUTPATIENT
Start: 2022-01-14 | End: 2022-06-27

## 2022-01-14 RX ORDER — SERTRALINE HYDROCHLORIDE 50 MG/1
50 TABLET, FILM COATED ORAL DAILY
Qty: 90 TABLET | Refills: 0 | Status: SHIPPED | OUTPATIENT
Start: 2022-01-14 | End: 2022-03-30

## 2022-03-30 RX ORDER — SERTRALINE HYDROCHLORIDE 50 MG/1
TABLET, FILM COATED ORAL
Qty: 90 TABLET | Refills: 0 | Status: SHIPPED | OUTPATIENT
Start: 2022-03-30 | End: 2022-06-27

## 2022-04-05 ENCOUNTER — TELEPHONE (OUTPATIENT)
Dept: PRIMARY CARE | Facility: CLINIC | Age: 22
End: 2022-04-05
Payer: COMMERCIAL

## 2022-04-05 ENCOUNTER — TELEPHONE (OUTPATIENT)
Dept: PRIMARY CARE | Facility: CLINIC | Age: 22
End: 2022-04-05

## 2022-04-05 DIAGNOSIS — R53.83 OTHER FATIGUE: Primary | ICD-10-CM

## 2022-04-05 NOTE — TELEPHONE ENCOUNTER
Received call, pt went to [facility], orders were [facility] to [facility]. Replaced orders to [facility] and sent pt a message and faxed order slip to her [facility] location.

## 2022-04-06 LAB
25(OH)D3+25(OH)D2 SERPL-MCNC: 25.7 NG/ML (ref 30–100)
ERYTHROCYTE [DISTWIDTH] IN BLOOD BY AUTOMATED COUNT: 12.7 % (ref 11.7–15.4)
FERRITIN SERPL-MCNC: 39 NG/ML (ref 15–150)
HCT VFR BLD AUTO: 39.8 % (ref 34–46.6)
HGB BLD-MCNC: 13.3 G/DL (ref 11.1–15.9)
IRON SATN MFR SERPL: 20 % (ref 15–55)
IRON SERPL-MCNC: 69 UG/DL (ref 27–159)
MCH RBC QN AUTO: 32.3 PG (ref 26.6–33)
MCHC RBC AUTO-ENTMCNC: 33.4 G/DL (ref 31.5–35.7)
MCV RBC AUTO: 97 FL (ref 79–97)
PLATELET # BLD AUTO: 268 X10E3/UL (ref 150–450)
RBC # BLD AUTO: 4.12 X10E6/UL (ref 3.77–5.28)
T4 FREE SERPL-MCNC: 1.09 NG/DL (ref 0.82–1.77)
TIBC SERPL-MCNC: 346 UG/DL (ref 250–450)
TSH SERPL DL<=0.005 MIU/L-ACNC: 4.96 UIU/ML (ref 0.45–4.5)
UIBC SERPL-MCNC: 277 UG/DL (ref 131–425)
VIT B12 SERPL-MCNC: 398 PG/ML (ref 232–1245)
WBC # BLD AUTO: 7.7 X10E3/UL (ref 3.4–10.8)

## 2022-05-11 NOTE — PROGRESS NOTES
ESTABLISHED PATIENT OFFICE VISIT    CAROLINA LOMAX D.O.  [facility]  [address] [place] [address]  [address]  [place] [address] [phone]      HISTORY OF PRESENT ILLNESS        HPI:  Nasreen Hector is a 21 y.o. female with a history of polyarthralgia, migraine, Hashimoto's thyroiditis, anxiety, and vitamin D insufficiency who presents for follow-up.     Chronic Leg Pain:  Still having a lot of joint pain, some days are worse than others. She will have times where she is lying in bed at night and times where her joints feel like they are aching and throbbing. She feels like her muscles feel weak and her legs are in pain. It has been really hard to walk even with short distances. Muscles feel really exhausted even when she isn't doing anything.     Labs:  Lab Results   Component Value Date    B12 398 04/05/2022     Lab Results   Component Value Date    WMOB73SIP 27 (L) 03/29/2021    HEOD04UZ 25.7 (L) 04/05/2022     Lab Results   Component Value Date    FERRSER 39 04/05/2022    HGB 13.3 04/05/2022     Prior Mono Diagnosis:  [employer] year of high school she had mono (102-103 degree fever). She broke out in a rash and eventually went to her legs.     PAST MEDICAL AND SURGICAL HISTORY        Past Medical History:   Diagnosis Date   • Thyroid disease        Past Surgical History:   Procedure Laterality Date   • WISDOM TOOTH EXTRACTION       MEDICATIONS          Current Outpatient Medications:   •  adapalene (DIFFERIN) 0.1 % topical gel, Apply topically nightly., Disp: 45 g, Rfl: 0  •  BLISOVI 24 FE 1 mg-20 mcg (24)/75 mg (4) per tablet, , Disp: , Rfl:   •  levothyroxine (SYNTHROID) 25 mcg tablet, Take 1 tablet (25 mcg total) by mouth daily., Disp: 90 tablet, Rfl: 0  •  sertraline (ZOLOFT) 50 mg tablet, TAKE 1 TABLET BY MOUTH DAILY. TAKE WITH 25 MG TABLET FOR A TOTAL OF 75 MG DAILY., Disp: 90 tablet, Rfl: 0  •  sertraline 25 mg tablet, Take 1 tablet (25 mg total) by mouth daily. Take with Sertraline 50  mg tablet for a total of Sertraline 75 mg daily., Disp: 90 tablet, Rfl: 0  ALLERGIES        Patient has no known allergies.  FAMILY HISTORY        Family History   Problem Relation Age of Onset   • Hypertension Biological Mother    • Arthritis Biological Mother    • Arthritis Maternal Grandmother      SOCIAL/ TOBACCO HISTORY        Social History     Tobacco Use   • Smoking status: Former Smoker     Types: Electronic Cigarette, Cigarettes   • Smokeless tobacco: Never Used   • Tobacco comment: quit 2 weeks ago    Substance Use Topics   • Alcohol use: Yes   • Drug use: Not Currently     REVIEW OF SYSTEMS        Review of Systems   Constitutional: Positive for fatigue.   Musculoskeletal: Positive for arthralgias and myalgias.   Psychiatric/Behavioral: Positive for sleep disturbance.      PHYSICAL EXAMINATION     Visit Vitals  /88   Pulse 74   Temp 36.7 °C (98 °F)   Resp 16   Wt 50.3 kg (111 lb)   SpO2 98%   BMI 18.76 kg/m²        Physical Exam  Constitutional:       General: She is not in acute distress.     Appearance: She is not diaphoretic.   HENT:      Head: Normocephalic and atraumatic.      Nose: Nose normal.      Mouth/Throat:      Mouth: Mucous membranes are not dry.   Eyes:      Conjunctiva/sclera: Conjunctivae normal.      Pupils: Pupils are equal, round, and reactive to light.   Cardiovascular:      Rate and Rhythm: Normal rate and regular rhythm.      Heart sounds: No murmur heard.    No friction rub. No gallop.   Pulmonary:      Effort: Pulmonary effort is normal.      Breath sounds: No wheezing, rhonchi or rales.   Musculoskeletal:         General: No swelling, tenderness or deformity. Normal range of motion.      Cervical back: Neck supple.      Comments: Joint or muscle tenderness of the lower extremities.  No joint swelling noted on exam.   Neurological:      Mental Status: She is alert.      Cranial Nerves: No cranial nerve deficit.        ASSESSMENT AND PLAN   Assessment   Problem List Items  Addressed This Visit        Nervous    Polyarthralgia - Primary     Polyarthralgias with borderline positive MERLIN and anti-TPO antibodies.  Repeat labs. Also with myalgia and significant muscle fatigue with minimal exertion. Check CK.   Refer to rheumatology for 2nd opinion.   Consider fibromyalgia if work-up continues to be negative.   Symptoms are isolated to the lower extremities only.           Myalgia     See plan under polyarthralgia.            Relevant Orders    CK, Total    Comprehensive metabolic panel       Digestive    Vitamin D insufficiency    Relevant Orders    Vitamin D 25 hydroxy       Endocrine/Metabolic    Hashimoto's thyroiditis     Unable to tolerate levothyroxine due to palpitations and increased anxiety.   Start Synthroid (name brand) 25 mcg daily.            Relevant Medications    levothyroxine (SYNTHROID) 25 mcg tablet      Other Visit Diagnoses     Arthralgia, unspecified joint        Relevant Orders    Ambulatory referral to Rheumatology    TSH w reflex FT4    Rheumatoid factor    CCP IgG/IgA Antibodies    CK, Total    MERLIN Screen (Automated)    Comprehensive metabolic panel    History of mononucleosis syndrome        Relevant Orders    Duane-Barr virus VCA antibody panel             I spent 30 minutes on this date of service performing the following activities: obtaining history, performing examination, entering orders, documenting, preparing for visit and providing counseling and education.    Aislinn Kruse,   5/12/2022

## 2022-05-12 ENCOUNTER — OFFICE VISIT (OUTPATIENT)
Dept: PRIMARY CARE | Facility: CLINIC | Age: 22
End: 2022-05-12
Payer: COMMERCIAL

## 2022-05-12 VITALS
OXYGEN SATURATION: 98 % | HEART RATE: 74 BPM | DIASTOLIC BLOOD PRESSURE: 88 MMHG | WEIGHT: 111 LBS | TEMPERATURE: 98 F | RESPIRATION RATE: 16 BRPM | BODY MASS INDEX: 18.76 KG/M2 | SYSTOLIC BLOOD PRESSURE: 122 MMHG

## 2022-05-12 DIAGNOSIS — E06.3 HASHIMOTO'S THYROIDITIS: ICD-10-CM

## 2022-05-12 DIAGNOSIS — M79.10 MYALGIA: ICD-10-CM

## 2022-05-12 DIAGNOSIS — Z86.19: ICD-10-CM

## 2022-05-12 DIAGNOSIS — M25.50 ARTHRALGIA, UNSPECIFIED JOINT: ICD-10-CM

## 2022-05-12 DIAGNOSIS — E55.9 VITAMIN D INSUFFICIENCY: ICD-10-CM

## 2022-05-12 DIAGNOSIS — M25.50 POLYARTHRALGIA: Primary | ICD-10-CM

## 2022-05-12 PROCEDURE — 3008F BODY MASS INDEX DOCD: CPT | Performed by: FAMILY MEDICINE

## 2022-05-12 PROCEDURE — 99214 OFFICE O/P EST MOD 30 MIN: CPT | Performed by: FAMILY MEDICINE

## 2022-05-12 RX ORDER — LEVOTHYROXINE SODIUM 25 UG/1
25 TABLET ORAL
Qty: 90 TABLET | Refills: 0 | Status: SHIPPED | OUTPATIENT
Start: 2022-05-12 | End: 2022-08-15

## 2022-05-12 ASSESSMENT — ENCOUNTER SYMPTOMS
FATIGUE: 1
MYALGIAS: 1
SLEEP DISTURBANCE: 1
ARTHRALGIAS: 1

## 2022-05-12 NOTE — ASSESSMENT & PLAN NOTE
Polyarthralgias with borderline positive MERLIN and anti-TPO antibodies.  Repeat labs. Also with myalgia and significant muscle fatigue with minimal exertion. Check CK.   Refer to rheumatology for 2nd opinion.   Consider fibromyalgia if work-up continues to be negative.   Symptoms are isolated to the lower extremities only.

## 2022-05-12 NOTE — ASSESSMENT & PLAN NOTE
Unable to tolerate levothyroxine due to palpitations and increased anxiety.   Start Synthroid (name brand) 25 mcg daily.

## 2022-06-27 RX ORDER — SERTRALINE HYDROCHLORIDE 25 MG/1
TABLET, FILM COATED ORAL
Qty: 90 TABLET | Refills: 0 | Status: SHIPPED | OUTPATIENT
Start: 2022-06-27

## 2022-06-27 RX ORDER — SERTRALINE HYDROCHLORIDE 50 MG/1
TABLET, FILM COATED ORAL
Qty: 90 TABLET | Refills: 0 | Status: SHIPPED | OUTPATIENT
Start: 2022-06-27

## 2022-08-15 RX ORDER — LEVOTHYROXINE SODIUM 25 UG/1
TABLET ORAL
Qty: 30 TABLET | Refills: 0 | Status: SHIPPED | OUTPATIENT
Start: 2022-08-15